# Patient Record
Sex: MALE | Race: WHITE | NOT HISPANIC OR LATINO | Employment: FULL TIME | URBAN - METROPOLITAN AREA
[De-identification: names, ages, dates, MRNs, and addresses within clinical notes are randomized per-mention and may not be internally consistent; named-entity substitution may affect disease eponyms.]

---

## 2017-01-19 ENCOUNTER — TELEPHONE (OUTPATIENT)
Dept: ORTHOPEDIC SURGERY | Facility: CLINIC | Age: 27
End: 2017-01-19

## 2017-01-19 DIAGNOSIS — Z98.890 S/P RIGHT KNEE ARTHROSCOPY: Primary | ICD-10-CM

## 2017-01-23 ENCOUNTER — HOSPITAL ENCOUNTER (OUTPATIENT)
Dept: PHYSICAL THERAPY | Facility: HOSPITAL | Age: 27
Setting detail: THERAPIES SERIES
Discharge: HOME | End: 2017-01-23

## 2017-01-23 DIAGNOSIS — S83.241A ACUTE MEDIAL MENISCAL TEAR, RIGHT, INITIAL ENCOUNTER: Primary | ICD-10-CM

## 2017-01-23 PROCEDURE — 97161 PT EVAL LOW COMPLEX 20 MIN: CPT
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
## 2017-01-25 ENCOUNTER — HOSPITAL ENCOUNTER (OUTPATIENT)
Dept: PHYSICAL THERAPY | Facility: HOSPITAL | Age: 27
Setting detail: THERAPIES SERIES
Discharge: HOME | End: 2017-01-25

## 2017-01-25 DIAGNOSIS — S83.241A ACUTE MEDIAL MENISCAL TEAR, RIGHT, INITIAL ENCOUNTER: Primary | ICD-10-CM

## 2017-01-25 PROCEDURE — 97110 THERAPEUTIC EXERCISES: CPT

## 2017-01-25 PROCEDURE — 97035 APP MDLTY 1+ULTRASOUND EA 15: CPT

## 2017-01-25 NOTE — PROGRESS NOTES
Outpatient Physical Therapy Ortho Treatment Note   Georgina Osman     Patient Name: Ciro Escoto  : 1990  MRN: [id number]  Today's Date: 2017      Visit Date: 2017    Visit Dx:    ICD-10-CM ICD-9-CM   1. Acute medial meniscal tear, right, initial encounter S83.241A 836.0       There is no problem list on file for this patient.       Past Medical History   Diagnosis Date   • Tear of meniscus of knee         Past Surgical History   Procedure Laterality Date   • Knee surgery     • Appendectomy     • Knee arthroscopy Right 2016     Procedure: KNEE ARTHROSCOPY;  Surgeon: Manjeet Cabral MD;  Location: [facility];  Service:              PT Ortho       17 0700    Posture/Observations    Posture- WNL Posture is WNL  -AS    Sensation    Sensation WNL? WNL  -AS    Knee Palpation    Patella Right:   Not Tender  -AS    Patella Tendon Right:;Tender  -AS    Medial Joint Line Right:;Tender  -AS    Lateral Joint Line Right:   Not Tender  -AS    Quads Right:   Not Tender  -AS    Patellar Accessory Motions    Superior glide Right:;WNL  -AS    Inferior glide Right:;WNL  -AS    Medial glide Right:;WNL  -AS    Lateral glide Right:;WNL  -AS    Right Knee    Extension/Flexion AROM Deficit 0-127 post stretch   0-118 pre stretch  -AS    Right Hip    Hip Flexion Gross Movement (5/5) normal  -AS    Hip Extension Gross Movement (4+/5) good plus  -AS    Hip ABduction Gross Movement (4+/5) good plus  -AS    Right Knee    Knee Extension Gross Movement (4-/5) good minus  -AS    Knee Flexion Gross Movement (4-/5) good minus  -AS    Lower Extremity Flexibility    Hamstrings Right:;Mildly limited  -AS      User Key  (r) = Recorded By, (t) = Taken By, (c) = Cosigned By    Initials Name Provider Type    AS Aanm Montero, PT Physical Therapist                            PT Assessment/Plan       17 0600 17 0700       PT Assessment    Functional Limitations  Limitation in home management;Limitations in  community activities;Performance in leisure activities;Performance in self-care ADL;Performance in sport activities;Performance in work activities  -AS     Impairments  Edema;Impaired flexibility;Joint mobility;Muscle strength;Pain;Range of motion  -AS     Assessment Comments Progressed patient with strengthening exercises today as well as introduced modalities today without complaints.  -AS Patient reports to outpatient PT s/p right medial meniscus repair. Patient has dcereased flexion ROM, decreased strength, and increased pain in right knee. Patient has decreased function and decreased ability to perform work related activities.  -AS     Please refer to paper survey for additional self-reported information  Yes  -AS     Rehab Potential  Good  -AS     Patient/caregiver participated in establishment of treatment plan and goals  Yes  -AS     Patient would benefit from skilled therapy intervention  Yes  -AS     PT Plan    PT Frequency  2x/week  -AS     Predicted Duration of Therapy Intervention (days/wks)  4-6 weeks  -AS     Planned CPT's?  PT RE-EVAL: 92463;PT THER PROC EA 15 MIN: 98270;PT THER ACT EA 15 MIN: 08129;PT MANUAL THERAPY EA 15 MIN: 37716;PT NEUROMUSC RE-EDUCATION EA 15 MIN: 56640;PT HOT OR COLD PACK TREAT MCARE;PT ELECTRICAL STIM UNATTEND: ;PT ULTRASOUND EA 15 MIN: 93787  -AS     PT Plan Comments Progress patient with strengthening, flexibility, and ROM exercises as tolerate dby the patient in order to retun him to his prior level of function.   -AS        User Key  (r) = Recorded By, (t) = Taken By, (c) = Cosigned By    Initials Name Provider Type    AS Anam Montero, PT Physical Therapist                Modalities       01/25/17 0600          Ice    Ice Applied Yes  -AS      Location Right knee  -AS      Rx Minutes 10 mins  -AS      Ice S/P Rx Yes  -AS      Ultrasound 93552    Location right medial/lateral meniscus  -AS      Rx Minutes 8 min  -AS      Duty Cycle 100  -AS      Frequency 1.0  MHz  -AS      Intensity - Wts/cm 1.2  -AS        User Key  (r) = Recorded By, (t) = Taken By, (c) = Cosigned By    Initials Name Provider Type    AS Anam Montero, PT Physical Therapist                Exercises       01/25/17 0600          Subjective Comments    Subjective Comments Patient states he was a little sore after his first visit but states this is improving.  -AS      Exercise 1    Exercise Name 1 Cycle   seat 1  -AS      Time (Minutes) 1 5 min  -AS      Exercise 2    Exercise Name 2 HS Stretch  -AS      Reps 2 10  -AS      Time (Seconds) 2 10 sec hold  -AS      Exercise 3    Exercise Name 3 4-Way Hip  -AS      Reps 3 30  -AS      Exercise 4    Exercise Name 4 LAQ  -AS      Reps 4 30  -AS      Exercise 5    Exercise Name 5 TKE  -AS      Equipment 5 Theraband  -AS      Resistance 5 Other (comment)   Gold  -AS      Reps 5 30  -AS      Exercise 6    Exercise Name 6 FWD Step Ups   6 inch step  -AS      Reps 6 15  -AS      Exercise 7    Exercise Name 7 Lateral Step Overs   6 inch step  -AS      Reps 7 15  -AS      Exercise 8    Exercise Name 8 Lateral Dips   2 inch step  -AS      Reps 8 15  -AS        User Key  (r) = Recorded By, (t) = Taken By, (c) = Cosigned By    Initials Name Provider Type    AS Anam Montero, PT Physical Therapist                               PT OP Goals       01/23/17 0700          PT Short Term Goals    STG Date to Achieve 02/13/17  -AS      STG 1 Patient to be compliant with his initial HEP for flexibility, ROM, and strength.  -AS      STG 2 Patient to improve his right knee flexion ROM to 120 degrees.  -AS      STG 3 Patient to improve his right knee flexion and extension MMT to 4/5.  -AS      STG 4 Patient to report pain of on VAS of 3-4/10 with ADL's and work related activities.  -AS      Long Term Goals    LTG Date to Achieve 03/06/17  -AS      LTG 1 Patient to be compliant with his advanced HEP for flexibility, ROM, and strength.  -AS      LTG 2 Patient to improve  his right knee flexion ROM to 125 degrees.  -AS      LTG 3 Patient to improve his right knee flexion and extension MMT to 5/5.  -AS      LTG 4 Patient to report pain of on VAS of 0-1/10 with ADL's and work related activities.  -AS      LTG 5 Patient to report improved function and decreased pain on LEFS by 15-20 points.  -AS      Time Calculation    PT Goal Re-Cert Due Date 02/20/17  -AS        User Key  (r) = Recorded By, (t) = Taken By, (c) = Cosigned By    Initials Name Provider Type    AS Anam Montero, JED Physical Therapist                Therapy Education       01/23/17 0730    Therapy Education    Given HEP  -AS    Program New  -AS    How Provided Verbal;Demonstration;Written  -AS    Provided to Patient  -AS    Level of Understanding Teach back education performed;Verbalized;Demonstrated  -AS      User Key  (r) = Recorded By, (t) = Taken By, (c) = Cosigned By    Initials Name Provider Type    AS Aanm Montero PT Physical Therapist                Outcome Measures       01/23/17 0700          Lower Extremity Functional Index    Any of your usual work, housework or school activities 3  -AS      Your usual hobbies, recreational or sporting activities 2  -AS      Getting into or out of the bath 3  -AS      Walking between rooms 3  -AS      Putting on your shoes or socks 3  -AS      Squatting 1  -AS      Lifting an object, like a bag of groceries from the floor 2  -AS      Performing light activities around your home 3  -AS      Performing heavy activities around your home 2  -AS      Getting into or out of a car 3  -AS      Walking 2 blocks 2  -AS      Walking a mile 1  -AS      Going up or down 10 stairs (about 1 flight of stairs) 1  -AS      Standing for 1 hour 1  -AS      Sitting for 1 hour 2  -AS      Running on even ground 2  -AS      Running on uneven ground 1  -AS      Making sharp turns while running fast 1  -AS      Hopping 1  -AS      Rolling over in bed 4  -AS      Total 41  -AS       Functional Assessment    Outcome Measure Options Lower Extremity Functional Scale (LEFS)  -AS        User Key  (r) = Recorded By, (t) = Taken By, (c) = Cosigned By    Initials Name Provider Type    AS Anam Montero, PT Physical Therapist            Time Calculation:   Start Time: 0615  Stop Time: 0707  Time Calculation (min): 52 min    Therapy Charges for Today     Code Description Service Date Service Provider Modifiers Qty    91586098085  PT THER PROC EA 15 MIN 1/25/2017 Anam Montero, PT GP 2    [id number]  PT ULTRASOUND EA 15 MIN 1/25/2017 Anam Montero, PT GP 1                    Anam Montero, PT  1/25/2017

## 2017-01-31 ENCOUNTER — TELEPHONE (OUTPATIENT)
Dept: ORTHOPEDIC SURGERY | Facility: CLINIC | Age: 27
End: 2017-01-31

## 2017-02-01 NOTE — TELEPHONE ENCOUNTER
Patient states he did not go for therapy today, pain was too great.  He will be scheduled to see Dr. Cabral at the next open appointment.

## 2017-02-02 ENCOUNTER — OFFICE VISIT (OUTPATIENT)
Dept: ORTHOPEDIC SURGERY | Facility: CLINIC | Age: 27
End: 2017-02-02

## 2017-02-02 VITALS — BODY MASS INDEX: 23.03 KG/M2 | WEIGHT: 170 LBS | HEIGHT: 72 IN

## 2017-02-02 DIAGNOSIS — Z98.890 S/P RIGHT KNEE ARTHROSCOPY: Primary | ICD-10-CM

## 2017-02-02 DIAGNOSIS — R52 PAIN: ICD-10-CM

## 2017-02-02 PROCEDURE — 99212 OFFICE O/P EST SF 10 MIN: CPT | Performed by: ORTHOPAEDIC SURGERY

## 2017-02-02 RX ORDER — MELOXICAM 15 MG/1
15 TABLET ORAL DAILY
Qty: 30 TABLET | Refills: 5 | Status: SHIPPED | OUTPATIENT
Start: 2017-02-02

## 2017-02-02 NOTE — PROGRESS NOTES
"Subjective: Right knee pain     Patient ID: Ciro Escoto is a 26 y.o. male.    Chief Complaint:    History of Present Illness patient returns stating his knee is still symptomatic.  After couple of therapy visits he states the pain was worse he stopped going.  States he also had a doing stretching and strengthening exercises in addition to the ultrasound.       Social History     Occupational History   • Not on file.     Social History Main Topics   • Smoking status: Never Smoker   • Smokeless tobacco: Never Used   • Alcohol use 6.0 oz/week     8 Cans of beer, 2 Shots of liquor per week      Comment: \"social\"   • Drug use: No   • Sexual activity: Defer      Review of Systems   Constitutional: Negative for chills, diaphoresis, fever and unexpected weight change.   HENT: Negative for hearing loss, nosebleeds, sore throat and tinnitus.    Eyes: Negative for pain and visual disturbance.   Respiratory: Negative for cough, shortness of breath and wheezing.    Cardiovascular: Negative for chest pain and palpitations.   Gastrointestinal: Negative for abdominal pain, diarrhea, nausea and vomiting.   Endocrine: Negative for cold intolerance, heat intolerance and polydipsia.   Genitourinary: Negative for difficulty urinating, dysuria and hematuria.   Musculoskeletal: Positive for myalgias. Negative for arthralgias and joint swelling.   Skin: Negative for rash and wound.   Allergic/Immunologic: Negative for environmental allergies.   Neurological: Negative for dizziness, syncope and numbness.   Hematological: Does not bruise/bleed easily.   Psychiatric/Behavioral: Negative for dysphoric mood and sleep disturbance. The patient is not nervous/anxious.          Past Medical History   Diagnosis Date   • Tear of meniscus of knee      Past Surgical History   Procedure Laterality Date   • Knee surgery     • Appendectomy     • Knee arthroscopy Right 9/30/2016     Procedure: KNEE ARTHROSCOPY;  Surgeon: Manjeet Cabral MD;  [facility]: [facility];  Service:      History reviewed. No pertinent family history.      Objective:  There were no vitals filed for this visit.  Last 3 weights    02/02/17  1059   Weight: 170 lb (77.1 kg)     Body mass index is 23.06 kg/(m^2).       Ortho Exam  is no effusion or swelling erythema to the right knee.  He has full range of motion with no crepitus or instability.  No motor deficit.    Assessment:     No diagnosis found.      Plan:        We'll increase the Mobic to 15 mg daily will restart therapy consisting of ultrasound and e-stim only.  Return in 4 weeks  Work Status:    CJ query complete.    Orders:  No orders of the defined types were placed in this encounter.      Medications:  No orders of the defined types were placed in this encounter.      Followup:  No Follow-up on file.          Dragon transcription disclaimer     Much of this encounter note is an electronic transcription/translation of spoken language to printed text. The electronic translation of spoken language may permit erroneous, or at times, nonsensical words or phrases to be inadvertently transcribed. Although I have reviewed the note for such errors, some may still exist.

## 2017-02-08 ENCOUNTER — DOCUMENTATION (OUTPATIENT)
Dept: PHYSICAL THERAPY | Facility: HOSPITAL | Age: 27
End: 2017-02-08

## 2017-02-08 DIAGNOSIS — S83.241A ACUTE MEDIAL MENISCAL TEAR, RIGHT, INITIAL ENCOUNTER: Primary | ICD-10-CM

## 2017-02-08 NOTE — PROGRESS NOTES
**Missed Visit**      Patient's mom called to schedule today's appointment at 7:00am. This appointment time/date was confirmed with patient. Patient failed to show for his appointment this morning. He has now failed to show for his last 3 physical therapy appointments.

## 2017-02-10 ENCOUNTER — HOSPITAL ENCOUNTER (OUTPATIENT)
Dept: PHYSICAL THERAPY | Facility: HOSPITAL | Age: 27
Setting detail: THERAPIES SERIES
Discharge: HOME | End: 2017-02-10

## 2017-02-10 DIAGNOSIS — S83.241A ACUTE MEDIAL MENISCAL TEAR, RIGHT, INITIAL ENCOUNTER: Primary | ICD-10-CM

## 2017-02-10 PROCEDURE — 97035 APP MDLTY 1+ULTRASOUND EA 15: CPT

## 2017-02-10 NOTE — PROGRESS NOTES
Outpatient Physical Therapy Ortho Treatment Note   Georgina Osman     Patient Name: Ciro Escoto  : 1990  MRN: [id number]  Today's Date: 2/10/2017      Visit Date: 02/10/2017    Visit Dx:    ICD-10-CM ICD-9-CM   1. Acute medial meniscal tear, right, initial encounter S83.241A 836.0       There is no problem list on file for this patient.       Past Medical History   Diagnosis Date   • Tear of meniscus of knee         Past Surgical History   Procedure Laterality Date   • Knee surgery     • Appendectomy     • Knee arthroscopy Right 2016     Procedure: KNEE ARTHROSCOPY;  Surgeon: Manjeet Cabral MD;  Location: [facility];  Service:                              PT Assessment/Plan       02/10/17 1200          PT Assessment    Assessment Comments Performed ultrasound only today to patient's right patellar tendon. he tolerated this well today without complaints of increased pain or discomfort.  -AS      PT Plan    PT Plan Comments Continue with plan of care per physician.  -AS        User Key  (r) = Recorded By, (t) = Taken By, (c) = Cosigned By    Initials Name Provider Type    AS Anam Montero PT Physical Therapist                Modalities       02/10/17 1200          Ultrasound 67733    Location Right patellar tendon  -AS      Rx Minutes 8 min  -AS      Duty Cycle 100  -AS      Frequency 1.0 MHz  -AS      Intensity - Wts/cm 1.5  -AS        User Key  (r) = Recorded By, (t) = Taken By, (c) = Cosigned By    Initials Name Provider Type    AS Anam Montero, PT Physical Therapist                Exercises       02/10/17 1200          Subjective Comments    Subjective Comments Patient states he was in a lot of pain a few days ago but states his pain has improved. He states he is only wanting Ultrasound for treatment.  -AS        User Key  (r) = Recorded By, (t) = Taken By, (c) = Cosigned By    Initials Name Provider Type    AS Anam Montero PT Physical Therapist                                        Time Calculation:   Start Time: 0620  Stop Time: 0636  Time Calculation (min): 16 min    Therapy Charges for Today     Code Description Service Date Service Provider Modifiers Qty    11088127071 HC PT ULTRASOUND EA 15 MIN 2/10/2017 Anam Montero, PT GP 1                    Anam Montero, PT  2/10/2017

## 2017-02-14 ENCOUNTER — DOCUMENTATION (OUTPATIENT)
Dept: PHYSICAL THERAPY | Facility: HOSPITAL | Age: 27
End: 2017-02-14

## 2017-02-14 ENCOUNTER — APPOINTMENT (OUTPATIENT)
Dept: PHYSICAL THERAPY | Facility: HOSPITAL | Age: 27
End: 2017-02-14

## 2017-02-14 DIAGNOSIS — S83.241A ACUTE MEDIAL MENISCAL TEAR, RIGHT, INITIAL ENCOUNTER: Primary | ICD-10-CM

## 2017-02-14 NOTE — PROGRESS NOTES
**MISSED VISIT NOTE**      Patient failed to show for his outpatient PT appointment again this morning. He has been documented as a no call/no show.

## 2017-02-16 ENCOUNTER — HOSPITAL ENCOUNTER (OUTPATIENT)
Dept: PHYSICAL THERAPY | Facility: HOSPITAL | Age: 27
Setting detail: THERAPIES SERIES
Discharge: HOME | End: 2017-02-16

## 2017-02-16 DIAGNOSIS — S83.241A ACUTE MEDIAL MENISCAL TEAR, RIGHT, INITIAL ENCOUNTER: Primary | ICD-10-CM

## 2017-02-16 PROCEDURE — 97035 APP MDLTY 1+ULTRASOUND EA 15: CPT

## 2017-02-16 NOTE — PROGRESS NOTES
Outpatient Physical Therapy Ortho Treatment Note   Georgina Osman     Patient Name: Ciro Escoto  : 1990  MRN: [id number]  Today's Date: 2017      Visit Date: 2017    Visit Dx:    ICD-10-CM ICD-9-CM   1. Acute medial meniscal tear, right, initial encounter S83.241A 836.0       There is no problem list on file for this patient.       Past Medical History   Diagnosis Date   • Tear of meniscus of knee         Past Surgical History   Procedure Laterality Date   • Knee surgery     • Appendectomy     • Knee arthroscopy Right 2016     Procedure: KNEE ARTHROSCOPY;  Surgeon: Manjeet Cabral MD;  Location: [facility];  Service:                              PT Assessment/Plan       17 0700 02/10/17 1200       PT Assessment    Assessment Comments Performed ultrasound only today to patient's right patellar tendon. he tolerated this well today without complaints of increased pain or discomfort.  -AS Performed ultrasound only today to patient's right patellar tendon. he tolerated this well today without complaints of increased pain or discomfort.  -AS     PT Plan    PT Plan Comments Continue with plan of care per physician.  -AS Continue with plan of care per physician.  -AS       User Key  (r) = Recorded By, (t) = Taken By, (c) = Cosigned By    Initials Name Provider Type    AS Anam Montero, PT Physical Therapist                Modalities       17 07          Ultrasound 39826    Location Right patellar tendon  -AS      Rx Minutes 8 min  -AS      Duty Cycle 100  -AS      Frequency 1.0 MHz  -AS      Intensity - Wts/cm 1.5  -AS        User Key  (r) = Recorded By, (t) = Taken By, (c) = Cosigned By    Initials Name Provider Type    AS Anam Montero, PT Physical Therapist                Exercises       17 07          Subjective Comments    Subjective Comments Patient states his knee is feeling about the same.  -AS        User Key  (r) = Recorded By, (t) = Taken By, (c) =  Cosigned By    Initials Name Provider Type    AS Anam Montero, PT Physical Therapist                                       Time Calculation:   Start Time: 0702  Stop Time: 0713  Time Calculation (min): 11 min    Therapy Charges for Today     Code Description Service Date Service Provider Modifiers Qty    63770459647 HC PT ULTRASOUND EA 15 MIN 2/16/2017 Anam Montero, PT GP 1                    Anam Montero, PT  2/16/2017

## 2017-02-20 ENCOUNTER — HOSPITAL ENCOUNTER (OUTPATIENT)
Dept: PHYSICAL THERAPY | Facility: HOSPITAL | Age: 27
Setting detail: THERAPIES SERIES
Discharge: HOME | End: 2017-02-20

## 2017-02-20 DIAGNOSIS — S83.241A ACUTE MEDIAL MENISCAL TEAR, RIGHT, INITIAL ENCOUNTER: Primary | ICD-10-CM

## 2017-02-20 PROCEDURE — 97035 APP MDLTY 1+ULTRASOUND EA 15: CPT

## 2017-02-20 NOTE — PROGRESS NOTES
Outpatient Physical Therapy Ortho Treatment Note   Georgina Osman     Patient Name: Ciro Escoto  : 1990  MRN: [id number]  Today's Date: 2017      Visit Date: 2017    Visit Dx:    ICD-10-CM ICD-9-CM   1. Acute medial meniscal tear, right, initial encounter S83.241A 836.0       There is no problem list on file for this patient.       Past Medical History   Diagnosis Date   • Tear of meniscus of knee         Past Surgical History   Procedure Laterality Date   • Knee surgery     • Appendectomy     • Knee arthroscopy Right 2016     Procedure: KNEE ARTHROSCOPY;  Surgeon: Manjeet Cabral MD;  Location: [facility];  Service:                              PT Assessment/Plan       17       PT Assessment    Assessment Comments Continue with plan of care per physician.  -AS Performed ultrasound only today to patient's right patellar tendon. he tolerated this well today without complaints of increased pain or discomfort.  -AS     PT Plan    PT Plan Comments Continue with plan of care per physician.  -AS Continue with plan of care per physician.  -AS       User Key  (r) = Recorded By, (t) = Taken By, (c) = Cosigned By    Initials Name Provider Type    AS Anam Montero, PT Physical Therapist                Modalities       17          Subjective Comments    Subjective Comments Patient reports improved knee pain.  -AS      Ultrasound 66841    Location Right patellar tendon  -AS      Rx Minutes 8 min  -AS      Duty Cycle 100  -AS      Frequency 1.0 MHz  -AS      Intensity - Wts/cm 1.5  -AS        User Key  (r) = Recorded By, (t) = Taken By, (c) = Cosigned By    Initials Name Provider Type    AS Anam Montero, PT Physical Therapist                Exercises       17          Subjective Comments    Subjective Comments Patient reports improved knee pain.  -AS        User Key  (r) = Recorded By, (t) = Taken By, (c) = Cosigned By    Initials Name Provider  Type    AS Anam Montero, PT Physical Therapist                                       Time Calculation:   Start Time: 0716  Stop Time: 0735  Time Calculation (min): 19 min    Therapy Charges for Today     Code Description Service Date Service Provider Modifiers Qty    09563079275 HC PT ULTRASOUND EA 15 MIN 2/20/2017 Anam Montero, PT GP 1                    Anam Montero, PT  2/20/2017

## 2017-02-23 ENCOUNTER — APPOINTMENT (OUTPATIENT)
Dept: PHYSICAL THERAPY | Facility: HOSPITAL | Age: 27
End: 2017-02-23

## 2017-03-23 ENCOUNTER — OFFICE VISIT (OUTPATIENT)
Dept: ORTHOPEDIC SURGERY | Facility: CLINIC | Age: 27
End: 2017-03-23

## 2017-03-23 DIAGNOSIS — R52 PAIN: ICD-10-CM

## 2017-03-23 DIAGNOSIS — Z98.890 S/P RIGHT KNEE ARTHROSCOPY: Primary | ICD-10-CM

## 2017-03-23 PROCEDURE — 99212 OFFICE O/P EST SF 10 MIN: CPT | Performed by: ORTHOPAEDIC SURGERY

## 2017-03-23 NOTE — PROGRESS NOTES
"Subjective: Right knee pain     Patient ID: Ciro Escoto is a 26 y.o. male.    Chief Complaint:    History of Present Illness 26-year-old male returns still having moderate to severe pain and discomfort in his right knee despite anti-inflammatories cortisone injection and physical therapy.  His been 6 months since his surgery for the meniscal tear and he still any symptoms in the knee.       Social History     Occupational History   • Not on file.     Social History Main Topics   • Smoking status: Never Smoker   • Smokeless tobacco: Never Used   • Alcohol use 6.0 oz/week     8 Cans of beer, 2 Shots of liquor per week      Comment: \"social\"   • Drug use: No   • Sexual activity: Defer      Review of Systems   Constitutional: Negative for chills, diaphoresis, fever and unexpected weight change.   HENT: Negative for hearing loss, nosebleeds, sore throat and tinnitus.    Eyes: Negative for pain and visual disturbance.   Respiratory: Negative for cough, shortness of breath and wheezing.    Cardiovascular: Negative for chest pain and palpitations.   Gastrointestinal: Negative for abdominal pain, diarrhea, nausea and vomiting.   Endocrine: Negative for cold intolerance, heat intolerance and polydipsia.   Genitourinary: Negative for difficulty urinating, dysuria and hematuria.   Musculoskeletal: Negative for arthralgias, joint swelling and myalgias.   Skin: Negative for rash and wound.   Allergic/Immunologic: Negative for environmental allergies.   Neurological: Negative for dizziness, syncope and numbness.   Hematological: Does not bruise/bleed easily.   Psychiatric/Behavioral: Negative for dysphoric mood and sleep disturbance. The patient is not nervous/anxious.    All other systems reviewed and are negative.        Past Medical History:   Diagnosis Date   • Tear of meniscus of knee      Past Surgical History:   Procedure Laterality Date   • APPENDECTOMY     • KNEE ARTHROSCOPY Right 9/30/2016    Procedure: KNEE " ARTHROSCOPY;  Surgeon: Manjeet Cabral MD;  Location: [facility];  Service:    • KNEE SURGERY       No family history on file.      Objective:  There were no vitals filed for this visit.  There were no vitals filed for this visit.  There is no height or weight on file to calculate BMI.       Ortho Exam  is no motor deficit good distal pulses.  His no effusion swelling erythema.  The skin is cool to touch.  He has some quad tendinitis but also significant medial joint line tenderness and anterior knee pain.  There is no instability.    Assessment:       1. S/P right knee arthroscopy    2. Pain          Plan:      I want to repeat the MRI to fully evaluate the knee return after completion.      Work Status:    [employer] query complete.    Orders:  Orders Placed This Encounter   Procedures   • MRI Knee Right Without Contrast       Medications:  No orders of the defined types were placed in this encounter.      Followup:  Return in about 1 week (around 3/30/2017).          Dragon transcription disclaimer     Much of this encounter note is an electronic transcription/translation of spoken language to printed text. The electronic translation of spoken language may permit erroneous, or at times, nonsensical words or phrases to be inadvertently transcribed. Although I have reviewed the note for such errors, some may still exist.

## 2017-03-29 ENCOUNTER — HOSPITAL ENCOUNTER (OUTPATIENT)
Dept: MRI IMAGING | Facility: HOSPITAL | Age: 27
Discharge: HOME | End: 2017-03-29
Attending: ORTHOPAEDIC SURGERY | Admitting: ORTHOPAEDIC SURGERY

## 2017-03-29 DIAGNOSIS — R52 PAIN: ICD-10-CM

## 2017-03-29 DIAGNOSIS — Z98.890 S/P RIGHT KNEE ARTHROSCOPY: ICD-10-CM

## 2017-03-29 PROCEDURE — 73721 MRI JNT OF LWR EXTRE W/O DYE: CPT

## 2017-04-10 ENCOUNTER — OFFICE VISIT (OUTPATIENT)
Dept: ORTHOPEDIC SURGERY | Facility: CLINIC | Age: 27
End: 2017-04-10

## 2017-04-10 DIAGNOSIS — Z98.890 S/P RIGHT KNEE ARTHROSCOPY: Primary | ICD-10-CM

## 2017-04-10 DIAGNOSIS — S83.241A ACUTE MEDIAL MENISCAL TEAR, RIGHT, INITIAL ENCOUNTER: ICD-10-CM

## 2017-04-10 PROCEDURE — 99213 OFFICE O/P EST LOW 20 MIN: CPT | Performed by: ORTHOPAEDIC SURGERY

## 2017-04-10 NOTE — PROGRESS NOTES
"Subjective: Right knee pain     Patient ID: Ciro Escoto is a 26 y.o. male.    Chief Complaint:    History of Present Illness 26-year-old male returns after having undergone the MRI of his right knee.    Reviewed the MRI personally and the report shows an apparent recurrent tear of the body posterior horn of the medial meniscus tear involving longitudinal oblique tear of the meniscus.  There is no loose body or other pathology noted.  He still having significant pain which she states interfering with activities of daily living.  Having pain with work and off work activities.  It is not resolved with treatment rendered to date.  Again he underwent surgery in September for a tear and has a new tear apparently per MRI.       Social History     Occupational History   • Not on file.     Social History Main Topics   • Smoking status: Never Smoker   • Smokeless tobacco: Never Used   • Alcohol use 6.0 oz/week     8 Cans of beer, 2 Shots of liquor per week      Comment: \"social\"   • Drug use: No   • Sexual activity: Defer      Review of Systems   Constitutional: Negative for chills, diaphoresis, fever and unexpected weight change.   HENT: Negative for hearing loss, nosebleeds, sore throat and tinnitus.    Eyes: Negative for pain and visual disturbance.   Respiratory: Negative for cough, shortness of breath and wheezing.    Cardiovascular: Negative for chest pain and palpitations.   Gastrointestinal: Negative for abdominal pain, diarrhea, nausea and vomiting.   Endocrine: Negative for cold intolerance, heat intolerance and polydipsia.   Genitourinary: Negative for difficulty urinating, dysuria and hematuria.   Musculoskeletal: Negative for arthralgias, joint swelling and myalgias.   Skin: Negative for rash and wound.   Allergic/Immunologic: Negative for environmental allergies.   Neurological: Negative for dizziness, syncope and numbness.   Hematological: Does not bruise/bleed easily.   Psychiatric/Behavioral: Negative for " dysphoric mood and sleep disturbance. The patient is not nervous/anxious.    All other systems reviewed and are negative.        Past Medical History:   Diagnosis Date   • Tear of meniscus of knee      Past Surgical History:   Procedure Laterality Date   • APPENDECTOMY     • KNEE ARTHROSCOPY Right 9/30/2016    Procedure: KNEE ARTHROSCOPY;  Surgeon: Manjeet Cabral MD;  Location: [facility];  Service:    • KNEE SURGERY       No family history on file.      Objective:  There were no vitals filed for this visit.  There were no vitals filed for this visit.  There is no height or weight on file to calculate BMI.       Ortho Exam  again I reviewed the MRI results with the patient.  He is alert and oriented.  The knee shows no swelling effusion erythema.  He has 0-125° of motion.  The skin is cool to touch.  There is no motor loss good distal pulses.  Calf is nontender to palpation with no swelling.  There is medial joint line tenderness with an equivocal medial Tico's.  No patella subluxation.  No crepitus noted.  No instability 0 90°.  His quads and hamstrings are 5 over 5.    Assessment:       1. S/P right knee arthroscopy    2. Acute medial meniscal tear, right, initial encounter          Plan:      reviewed the findings of the MRI with the patient and answered all questions regarding the findings and his physical exam.  At this time I recommend that if he is having more pain than is well-known the live with repeat arthroscopic debridement.  Again reviewed the length of rehabilitation rate which is plus or -4 week following surgery before returning to work.  He understands and wants to proceed with surgery or he needs to talk with work before scheduling but is looking at possibly April 21.      Work Status:    [employer] query complete.    Orders:  No orders of the defined types were placed in this encounter.      Medications:  No orders of the defined types were placed in this encounter.      Followup:  Return in about  10 days (around 4/20/2017).          Dragon transcription disclaimer     Much of this encounter note is an electronic transcription/translation of spoken language to printed text. The electronic translation of spoken language may permit erroneous, or at times, nonsensical words or phrases to be inadvertently transcribed. Although I have reviewed the note for such errors, some may still exist.

## 2017-04-12 ENCOUNTER — PREP FOR SURGERY (OUTPATIENT)
Dept: ORTHOPEDIC SURGERY | Facility: CLINIC | Age: 27
End: 2017-04-12

## 2017-04-18 ENCOUNTER — PREP FOR SURGERY (OUTPATIENT)
Dept: ORTHOPEDIC SURGERY | Facility: CLINIC | Age: 27
End: 2017-04-18

## 2017-04-18 DIAGNOSIS — S83.206A ACUTE MENISCAL TEAR OF KNEE, RIGHT, INITIAL ENCOUNTER: Primary | ICD-10-CM

## 2017-04-19 ENCOUNTER — PREP FOR SURGERY (OUTPATIENT)
Dept: ORTHOPEDIC SURGERY | Facility: CLINIC | Age: 27
End: 2017-04-19

## 2017-04-19 ENCOUNTER — LAB (OUTPATIENT)
Dept: LAB | Facility: HOSPITAL | Age: 27
End: 2017-04-19

## 2017-04-19 DIAGNOSIS — S83.206A ACUTE MENISCAL TEAR OF KNEE, RIGHT, INITIAL ENCOUNTER: Primary | ICD-10-CM

## 2017-04-19 DIAGNOSIS — S83.206A ACUTE MENISCAL TEAR OF KNEE, RIGHT, INITIAL ENCOUNTER: ICD-10-CM

## 2017-04-19 LAB
ANION GAP SERPL CALCULATED.3IONS-SCNC: 11.5 MMOL/L
BASOPHILS # BLD AUTO: 0.07 10*3/MM3 (ref 0–0.2)
BASOPHILS NFR BLD AUTO: 0.9 % (ref 0–2)
BUN BLD-MCNC: 13 MG/DL (ref 6–20)
BUN/CREAT SERPL: 13.1 (ref 7–25)
CALCIUM SPEC-SCNC: 9.5 MG/DL (ref 8.6–10.5)
CHLORIDE SERPL-SCNC: 99 MMOL/L (ref 98–107)
CO2 SERPL-SCNC: 26.5 MMOL/L (ref 22–29)
CREAT BLD-MCNC: 0.99 MG/DL (ref 0.76–1.27)
DEPRECATED RDW RBC AUTO: 39.1 FL (ref 37–54)
EOSINOPHIL # BLD AUTO: 0.26 10*3/MM3 (ref 0.1–0.3)
EOSINOPHIL NFR BLD AUTO: 3.4 % (ref 0–4)
ERYTHROCYTE [DISTWIDTH] IN BLOOD BY AUTOMATED COUNT: 12.3 % (ref 11.5–14.5)
GFR SERPL CREATININE-BSD FRML MDRD: 92 ML/MIN/1.73
GLUCOSE BLD-MCNC: 95 MG/DL (ref 65–99)
HCT VFR BLD AUTO: 43.4 % (ref 42–52)
HGB BLD-MCNC: 14.9 G/DL (ref 14–18)
IMM GRANULOCYTES # BLD: 0.02 10*3/MM3 (ref 0–0.03)
IMM GRANULOCYTES NFR BLD: 0.3 % (ref 0–0.5)
LYMPHOCYTES # BLD AUTO: 2.94 10*3/MM3 (ref 0.6–4.8)
LYMPHOCYTES NFR BLD AUTO: 38 % (ref 20–45)
MCH RBC QN AUTO: 29.9 PG (ref 27–31)
MCHC RBC AUTO-ENTMCNC: 34.3 G/DL (ref 31–37)
MCV RBC AUTO: 87 FL (ref 80–94)
MONOCYTES # BLD AUTO: 0.72 10*3/MM3 (ref 0–1)
MONOCYTES NFR BLD AUTO: 9.3 % (ref 3–8)
NEUTROPHILS # BLD AUTO: 3.72 10*3/MM3 (ref 1.5–8.3)
NEUTROPHILS NFR BLD AUTO: 48.1 % (ref 45–70)
NRBC BLD MANUAL-RTO: 0 /100 WBC (ref 0–0)
PLATELET # BLD AUTO: 240 10*3/MM3 (ref 140–500)
PMV BLD AUTO: 10.1 FL (ref 7.4–10.4)
POTASSIUM BLD-SCNC: 4.3 MMOL/L (ref 3.5–5.2)
RBC # BLD AUTO: 4.99 10*6/MM3 (ref 4.7–6.1)
SODIUM BLD-SCNC: 137 MMOL/L (ref 136–145)
WBC NRBC COR # BLD: 7.73 10*3/MM3 (ref 4.8–10.8)

## 2017-04-19 PROCEDURE — 36415 COLL VENOUS BLD VENIPUNCTURE: CPT

## 2017-04-19 PROCEDURE — 80048 BASIC METABOLIC PNL TOTAL CA: CPT

## 2017-04-19 PROCEDURE — 85025 COMPLETE CBC W/AUTO DIFF WBC: CPT

## 2017-04-20 ENCOUNTER — ANESTHESIA EVENT (OUTPATIENT)
Dept: PERIOP | Facility: HOSPITAL | Age: 27
End: 2017-04-20

## 2017-04-20 PROCEDURE — S0260 H&P FOR SURGERY: HCPCS | Performed by: ORTHOPAEDIC SURGERY

## 2017-04-20 NOTE — H&P
"Orthopedic Surgery    Patient Care Team:  Grover Larios MD as PCP - General (Family Medicine)    CHIEF COMPLAINT: Right knee pain    HISTORY OF PRESENT ILLNESS: 26-year-old male is being admitted this time to undergo arthroscopic surgery for recurrent right medial meniscal tear.  Had surgery in September 2016 for meniscal tear and did well initially after surgery went back to work and started having increasing pain discomfort once again in the knee.  He had persistent pain despite nonoperative management and a repeat MRI was ordered showed a recurrent tear of the medial meniscus is admitted this time to go repeat arthroscopic debridement          Past Medical History:   Diagnosis Date   • Tear of meniscus of knee      Past Surgical History:   Procedure Laterality Date   • APPENDECTOMY     • KNEE ARTHROSCOPY Right 9/30/2016    Procedure: KNEE ARTHROSCOPY;  Surgeon: Manjeet Cabral MD;  Location: [facility];  Service:    • KNEE SURGERY Right      History reviewed. No pertinent family history.  Social History   Substance Use Topics   • Smoking status: Never Smoker   • Smokeless tobacco: Never Used   • Alcohol use 6.0 oz/week     8 Cans of beer, 2 Shots of liquor per week      Comment: \"social\"     No prescriptions prior to admission.     Allergies:  Review of patient's allergies indicates no known allergies.    REVIEW OF SYSTEMS:  Please see the above history of present illness for pertinent positives and negatives.  The remainder of the patient's systems have been reviewed and are negative.    Vital Signs       Flowsheet Rows         First Filed Value    Admission Height  74\" (188 cm) Documented at 04/20/2017 1140    Admission Weight  160 lb (72.6 kg) Documented at 04/20/2017 1140           Physical Exam:  Physical Exam   Constitutional: Patient appears well-developed and well-nourished and in no acute distress   HEENT:   Head: Normocephalic and atraumatic.   Eyes:  Pupils are equal, round, and reactive to " light.  Mouth and Throat: Patient has moist mucous membranes. Oropharynx is clear of any erythema or exudate.     Neck: Neck supple. No JVD present. No thyromegaly present. No lymphadenopathy present.  Cardiovascular: Regular rate, regular rhythm.  Pulmonary/Chest: Lungs are clear to auscultation bilaterally.  Abdominal:benign,soft with bowel sounds  Musculoskeletal: Normal posture.  Extremities: Right leg is no motor deficit good distal pulses.  No sensory loss.  His calf is nontender and not swollen.  The knee shows no effusion but he has recurrent medial joint line tenderness.  No instability.  No crepitus noted.  No muscle atrophy as far as quad hamstring or calf muscles.    Neurological: Patient is alert and oriented.  Psychological:   Mood and behavior appropriate.  Skin: Skin is warm and dry.     Results Review:    I reviewed the patient's new clinical results.  Lab Results (most recent)     None          Imaging Results (most recent)     None          ECG/EMG Results (most recent)     None            Assessment/Plan MRI shows recurrent medial meniscal tear of the right knee.        I discussed the patients findings and my recommendations with patient and plan to proceed arthroscopic debridement of the right medial meniscus.  Discussed the surgery with the patient and he understands and agrees.  Reviewed risk and benefits of the surgery with the patient.    Manjeet Cabral MD  04/20/17  12:34 PM

## 2017-04-21 ENCOUNTER — ANESTHESIA (OUTPATIENT)
Dept: PERIOP | Facility: HOSPITAL | Age: 27
End: 2017-04-21

## 2017-04-21 ENCOUNTER — HOSPITAL ENCOUNTER (OUTPATIENT)
Facility: HOSPITAL | Age: 27
Setting detail: HOSPITAL OUTPATIENT SURGERY
Discharge: HOME | End: 2017-04-21
Attending: ORTHOPAEDIC SURGERY | Admitting: ORTHOPAEDIC SURGERY

## 2017-04-21 VITALS
RESPIRATION RATE: 15 BRPM | HEIGHT: 74 IN | WEIGHT: 156 LBS | SYSTOLIC BLOOD PRESSURE: 115 MMHG | TEMPERATURE: 98.2 F | OXYGEN SATURATION: 98 % | DIASTOLIC BLOOD PRESSURE: 83 MMHG | HEART RATE: 70 BPM | BODY MASS INDEX: 20.02 KG/M2

## 2017-04-21 DIAGNOSIS — S83.206A ACUTE MENISCAL TEAR OF KNEE, RIGHT, INITIAL ENCOUNTER: ICD-10-CM

## 2017-04-21 PROCEDURE — 25010000002 MIDAZOLAM PER 1 MG: Performed by: NURSE ANESTHETIST, CERTIFIED REGISTERED

## 2017-04-21 PROCEDURE — 25010000002 FENTANYL CITRATE (PF) 100 MCG/2ML SOLUTION: Performed by: NURSE ANESTHETIST, CERTIFIED REGISTERED

## 2017-04-21 PROCEDURE — 25010000002 MORPHINE SULFATE (PF) 2 MG/ML SOLUTION 1 ML SYRINGE: Performed by: ORTHOPAEDIC SURGERY

## 2017-04-21 PROCEDURE — 29881 ARTHRS KNE SRG MNISECTMY M/L: CPT | Performed by: ORTHOPAEDIC SURGERY

## 2017-04-21 PROCEDURE — 25010000002 PROPOFOL 10 MG/ML EMULSION: Performed by: NURSE ANESTHETIST, CERTIFIED REGISTERED

## 2017-04-21 PROCEDURE — 25010000002 ONDANSETRON PER 1 MG: Performed by: NURSE ANESTHETIST, CERTIFIED REGISTERED

## 2017-04-21 PROCEDURE — 25010000002 DEXAMETHASONE PER 1 MG: Performed by: NURSE ANESTHETIST, CERTIFIED REGISTERED

## 2017-04-21 PROCEDURE — 25010000002 KETOROLAC TROMETHAMINE PER 15 MG: Performed by: NURSE ANESTHETIST, CERTIFIED REGISTERED

## 2017-04-21 RX ORDER — ONDANSETRON 2 MG/ML
4 INJECTION INTRAMUSCULAR; INTRAVENOUS ONCE AS NEEDED
Status: COMPLETED | OUTPATIENT
Start: 2017-04-21 | End: 2017-04-21

## 2017-04-21 RX ORDER — KETOROLAC TROMETHAMINE 30 MG/ML
INJECTION, SOLUTION INTRAMUSCULAR; INTRAVENOUS AS NEEDED
Status: DISCONTINUED | OUTPATIENT
Start: 2017-04-21 | End: 2017-04-21 | Stop reason: SURG

## 2017-04-21 RX ORDER — BACITRACIN ZINC 500 [USP'U]/G
OINTMENT TOPICAL AS NEEDED
Status: DISCONTINUED | OUTPATIENT
Start: 2017-04-21 | End: 2017-04-21 | Stop reason: HOSPADM

## 2017-04-21 RX ORDER — FAMOTIDINE 10 MG/ML
20 INJECTION, SOLUTION INTRAVENOUS
Status: DISCONTINUED | OUTPATIENT
Start: 2017-04-21 | End: 2017-04-21 | Stop reason: HOSPADM

## 2017-04-21 RX ORDER — DEXAMETHASONE SODIUM PHOSPHATE 4 MG/ML
4 INJECTION, SOLUTION INTRA-ARTICULAR; INTRALESIONAL; INTRAMUSCULAR; INTRAVENOUS; SOFT TISSUE ONCE
Status: COMPLETED | OUTPATIENT
Start: 2017-04-21 | End: 2017-04-21

## 2017-04-21 RX ORDER — MIDAZOLAM HYDROCHLORIDE 1 MG/ML
1 INJECTION INTRAMUSCULAR; INTRAVENOUS
Status: DISCONTINUED | OUTPATIENT
Start: 2017-04-21 | End: 2017-04-21 | Stop reason: HOSPADM

## 2017-04-21 RX ORDER — LIDOCAINE HYDROCHLORIDE 20 MG/ML
INJECTION, SOLUTION INFILTRATION; PERINEURAL AS NEEDED
Status: DISCONTINUED | OUTPATIENT
Start: 2017-04-21 | End: 2017-04-21 | Stop reason: SURG

## 2017-04-21 RX ORDER — LIDOCAINE HYDROCHLORIDE 10 MG/ML
0.3 INJECTION, SOLUTION EPIDURAL; INFILTRATION; INTRACAUDAL; PERINEURAL ONCE
Status: COMPLETED | OUTPATIENT
Start: 2017-04-21 | End: 2017-04-21

## 2017-04-21 RX ORDER — PROPOFOL 10 MG/ML
VIAL (ML) INTRAVENOUS AS NEEDED
Status: DISCONTINUED | OUTPATIENT
Start: 2017-04-21 | End: 2017-04-21 | Stop reason: SURG

## 2017-04-21 RX ORDER — HYDROCODONE BITARTRATE AND ACETAMINOPHEN 5; 325 MG/1; MG/1
1 TABLET ORAL EVERY 4 HOURS PRN
Qty: 30 TABLET | Refills: 0 | Status: SHIPPED | OUTPATIENT
Start: 2017-04-21 | End: 2017-04-27 | Stop reason: SDUPTHER

## 2017-04-21 RX ORDER — MAGNESIUM HYDROXIDE 1200 MG/15ML
LIQUID ORAL AS NEEDED
Status: DISCONTINUED | OUTPATIENT
Start: 2017-04-21 | End: 2017-04-21 | Stop reason: HOSPADM

## 2017-04-21 RX ORDER — MORPHINE SULFATE 1 MG/ML
INJECTION, SOLUTION EPIDURAL; INTRATHECAL; INTRAVENOUS
Status: DISCONTINUED | OUTPATIENT
Start: 1840-12-31 | End: 2017-04-21 | Stop reason: HOSPADM

## 2017-04-21 RX ORDER — HYDROCODONE BITARTRATE AND ACETAMINOPHEN 5; 325 MG/1; MG/1
TABLET ORAL
Status: COMPLETED
Start: 2017-04-21 | End: 2017-04-21

## 2017-04-21 RX ORDER — HYDROCODONE BITARTRATE AND ACETAMINOPHEN 5; 325 MG/1; MG/1
1 TABLET ORAL ONCE AS NEEDED
Status: COMPLETED | OUTPATIENT
Start: 2017-04-21 | End: 2017-04-21

## 2017-04-21 RX ORDER — ONDANSETRON 2 MG/ML
4 INJECTION INTRAMUSCULAR; INTRAVENOUS ONCE AS NEEDED
Status: DISCONTINUED | OUTPATIENT
Start: 2017-04-21 | End: 2017-04-21 | Stop reason: HOSPADM

## 2017-04-21 RX ORDER — LIDOCAINE HYDROCHLORIDE 10 MG/ML
INJECTION, SOLUTION INFILTRATION; PERINEURAL AS NEEDED
Status: DISCONTINUED | OUTPATIENT
Start: 2017-04-21 | End: 2017-04-21 | Stop reason: HOSPADM

## 2017-04-21 RX ORDER — SODIUM CHLORIDE, SODIUM LACTATE, POTASSIUM CHLORIDE, CALCIUM CHLORIDE 600; 310; 30; 20 MG/100ML; MG/100ML; MG/100ML; MG/100ML
9 INJECTION, SOLUTION INTRAVENOUS CONTINUOUS
Status: DISCONTINUED | OUTPATIENT
Start: 2017-04-21 | End: 2017-04-21 | Stop reason: HOSPADM

## 2017-04-21 RX ORDER — SODIUM CHLORIDE 0.9 % (FLUSH) 0.9 %
1-10 SYRINGE (ML) INJECTION AS NEEDED
Status: DISCONTINUED | OUTPATIENT
Start: 2017-04-21 | End: 2017-04-21 | Stop reason: HOSPADM

## 2017-04-21 RX ORDER — FENTANYL CITRATE 50 UG/ML
INJECTION, SOLUTION INTRAMUSCULAR; INTRAVENOUS AS NEEDED
Status: DISCONTINUED | OUTPATIENT
Start: 2017-04-21 | End: 2017-04-21 | Stop reason: SURG

## 2017-04-21 RX ORDER — MIDAZOLAM HYDROCHLORIDE 1 MG/ML
2 INJECTION INTRAMUSCULAR; INTRAVENOUS
Status: DISCONTINUED | OUTPATIENT
Start: 2017-04-21 | End: 2017-04-21 | Stop reason: HOSPADM

## 2017-04-21 RX ADMIN — LIDOCAINE HYDROCHLORIDE 100 MG: 20 INJECTION, SOLUTION INFILTRATION; PERINEURAL at 07:31

## 2017-04-21 RX ADMIN — FENTANYL CITRATE 50 MCG: 50 INJECTION, SOLUTION INTRAMUSCULAR; INTRAVENOUS at 07:40

## 2017-04-21 RX ADMIN — LIDOCAINE HYDROCHLORIDE 100 MG: 20 INJECTION, SOLUTION INFILTRATION; PERINEURAL at 07:53

## 2017-04-21 RX ADMIN — ONDANSETRON 4 MG: 2 INJECTION, SOLUTION INTRAMUSCULAR; INTRAVENOUS at 07:23

## 2017-04-21 RX ADMIN — SODIUM CHLORIDE, POTASSIUM CHLORIDE, SODIUM LACTATE AND CALCIUM CHLORIDE 9 ML/HR: 600; 310; 30; 20 INJECTION, SOLUTION INTRAVENOUS at 06:33

## 2017-04-21 RX ADMIN — KETOROLAC TROMETHAMINE 30 MG: 30 INJECTION, SOLUTION INTRAMUSCULAR; INTRAVENOUS at 07:42

## 2017-04-21 RX ADMIN — HYDROCODONE BITARTRATE AND ACETAMINOPHEN 1 TABLET: 5; 325 TABLET ORAL at 08:35

## 2017-04-21 RX ADMIN — MIDAZOLAM HYDROCHLORIDE 2 MG: 1 INJECTION, SOLUTION INTRAMUSCULAR; INTRAVENOUS at 07:23

## 2017-04-21 RX ADMIN — LIDOCAINE HYDROCHLORIDE 0.3 ML: 10 INJECTION, SOLUTION EPIDURAL; INFILTRATION; INTRACAUDAL; PERINEURAL at 06:33

## 2017-04-21 RX ADMIN — SODIUM CHLORIDE, POTASSIUM CHLORIDE, SODIUM LACTATE AND CALCIUM CHLORIDE: 600; 310; 30; 20 INJECTION, SOLUTION INTRAVENOUS at 07:30

## 2017-04-21 RX ADMIN — FAMOTIDINE 20 MG: 10 INJECTION INTRAVENOUS at 07:24

## 2017-04-21 RX ADMIN — PROPOFOL 200 MG: 10 INJECTION, EMULSION INTRAVENOUS at 07:32

## 2017-04-21 RX ADMIN — DEXAMETHASONE SODIUM PHOSPHATE 4 MG: 4 INJECTION, SOLUTION INTRAMUSCULAR; INTRAVENOUS at 07:23

## 2017-04-21 NOTE — ANESTHESIA PROCEDURE NOTES
Airway  Urgency: elective    Date/Time: 4/21/2017 7:34 AM  End Time:4/21/2017 7:35 AM    General Information and Staff    Patient location during procedure: OR  CRNA: SARAHI KOLB    Indications and Patient Condition    Preoxygenated: yes  MILS maintained throughout  Mask difficulty assessment: 0 - not attempted    Final Airway Details  Final airway type: supraglottic airway      Successful airway: unique  Size 4    Number of attempts at approach: 1

## 2017-04-21 NOTE — PLAN OF CARE
Problem: Perioperative Period (Adult)  Goal: Signs and Symptoms of Listed Potential Problems Will be Absent or Manageable (Perioperative Period)  Outcome: Ongoing (interventions implemented as appropriate)    04/21/17 0684   Perioperative Period   Problems Assessed (Perioperative Period) all   Problems Present (Perioperative Period) pain

## 2017-04-21 NOTE — PLAN OF CARE
Problem: Patient Care Overview (Adult)  Goal: Plan of Care Review  Outcome: Outcome(s) achieved Date Met:  04/21/17 04/21/17 0836   Coping/Psychosocial Response Interventions   Plan Of Care Reviewed With patient   Patient Care Overview   Progress improving   Outcome Evaluation   Outcome Summary/Follow up Plan vss, waiting to go home

## 2017-04-21 NOTE — PLAN OF CARE
Problem: Patient Care Overview (Adult)  Goal: Adult Individualization and Mutuality  Outcome: Ongoing (interventions implemented as appropriate)    04/21/17 0619   Individualization   Patient Specific Preferences none

## 2017-04-21 NOTE — PLAN OF CARE
Problem: Perioperative Period (Adult)  Goal: Signs and Symptoms of Listed Potential Problems Will be Absent or Manageable (Perioperative Period)  Outcome: Outcome(s) achieved Date Met:  04/21/17 04/21/17 0836   Perioperative Period   Problems Assessed (Perioperative Period) all   Problems Present (Perioperative Period) none

## 2017-04-21 NOTE — ANESTHESIA POSTPROCEDURE EVALUATION
Patient: Ciro Escoto    Procedure Summary     Date Anesthesia Start Anesthesia Stop Room / Location    04/21/17 0730 0805 [facility] OR [facility] / [facility]       Procedure Diagnosis Surgeon Provider    KNEE ARTHROSCOPY medial meiscal debriedment (Right Knee) Acute meniscal tear of knee, right, initial encounter  (Acute meniscal tear of knee, right, initial encounter [S83.206A]) MD Nathalia Garza CRNA          Anesthesia Type: general  Last vitals  /71 (04/21/17 0817)    Temp 97.9 °F (36.6 °C) (04/21/17 0817)    Pulse 61 (04/21/17 0817)   Resp 18 (04/21/17 0817)    SpO2 100 % (04/21/17 0817)      Post Anesthesia Care and Evaluation    Patient location during evaluation: PHASE II  Patient participation: complete - patient participated  Level of consciousness: awake and alert  Pain score: 0  Pain management: adequate  Airway patency: patent  Anesthetic complications: No anesthetic complications  PONV Status: noneRespiratory status: acceptable  Hydration status: acceptable

## 2017-04-21 NOTE — PLAN OF CARE
Problem: Patient Care Overview (Adult)  Goal: Plan of Care Review  Outcome: Ongoing (interventions implemented as appropriate)    04/21/17 0626   Coping/Psychosocial Response Interventions   Plan Of Care Reviewed With patient   Patient Care Overview   Progress no change   Outcome Evaluation   Outcome Summary/Follow up Plan vss, waiting for procedure

## 2017-04-21 NOTE — PLAN OF CARE
Problem: Patient Care Overview (Adult)  Goal: Adult Individualization and Mutuality  Outcome: Outcome(s) achieved Date Met:  04/21/17 04/21/17 0626   Individualization   Patient Specific Preferences none

## 2017-04-21 NOTE — OP NOTE
DATE OF PROCEDURE: 04/21/2017    PREOPERATIVE DIAGNOSIS: Recurrent tear of medial meniscus, right knee.    POSTOPERATIVE DIAGNOSES:   1. Recurrent tear of medial meniscus, right knee.   2. 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  3. Partial tear, less than 10%, of anterior cruciate ligament.     PROCEDURES PERFORMED: Diagnostic and operative arthroscopy with debridement of the recurrent medial meniscal tear and debridement of chondral debris.     SURGEON: Manjeet Cabral MD     ANESTHESIA: General.     DESCRIPTION OF PROCEDURE: Patient was brought to the operating room and after satisfactory anesthesia was obtained, a pneumatic tourniquet was placed around the right upper leg. Timeout was completed, identifying the patient and the procedure correctly. The right leg was then exsanguinated. Tourniquet was elevated to 250 mmHg. It was then prepped, and after the prep dried for 3 minutes, it was draped in a sterile field in the usual manner with timeout again completed, identifying the patient and the procedure correctly. Medial and lateral portals were developed. Through the lateral portal, the diagnostic was carried out, at which time the above-stated findings were noted. Using a 4.0 shaver and the ArthroCare probe, debridement of the meniscal tear from the body to the posterior horn was carried out back to a stable peripheral rim. Debridement of chondral loose body was also carried out. Inspection of the lateral compartment was completely benign, the patellofemoral compartment was benign, and the medial compartment again showed the grade 1 changes on the medial femoral condyle, but it was minimal. The anterior cruciate did have some laxity, but it was probably less than 10% involved, and it was debrided with the ArthroCare probe. The joint was then lavaged and drained, then injected with 20 mL of 1% lidocaine with 2 mg of morphine. Portal sites were closed with subcutaneous 3-0 Vicryl and Steri-Strips.  Bulky compressive dressing was applied. Tourniquet was released. Patient awakened, taken to recovery, having tolerated the procedure well.     Manjeet Cabral M.D.  Antonia  D:  04/21/2017 08:03:42   T:  04/21/2017 08:21:48   Job ID:  [id number]   Document ID:  [id number]  cc:

## 2017-04-21 NOTE — ANESTHESIA PREPROCEDURE EVALUATION
Anesthesia Evaluation     Patient summary reviewed and Nursing notes reviewed   no history of anesthetic complications:  NPO Status: > 8 hours   Airway   Mallampati: II  TM distance: >3 FB  Neck ROM: full  no difficulty expected  Dental          Pulmonary - negative pulmonary ROS    breath sounds clear to auscultation  Cardiovascular   Exercise tolerance: good (4-7 METS)    Rhythm: regular  Rate: normal        Neuro/Psych- negative ROS  GI/Hepatic/Renal/Endo - negative ROS     Musculoskeletal     (+) joint swelling (right knee),   Abdominal    Substance History   (+) alcohol use (weekly), drug use (MJ 2x per day)     OB/GYN          Other                                    Anesthesia Plan    ASA 2     general     intravenous induction   Anesthetic plan and risks discussed with patient.  Use of blood products discussed with patient  Consented to blood products.

## 2017-04-21 NOTE — PLAN OF CARE
Problem: Perioperative Period (Adult)  Goal: Signs and Symptoms of Listed Potential Problems Will be Absent or Manageable (Perioperative Period)  Outcome: Ongoing (interventions implemented as appropriate)    04/21/17 0627   Perioperative Period   Problems Assessed (Perioperative Period) all   Problems Present (Perioperative Period) pain

## 2017-04-27 ENCOUNTER — OFFICE VISIT (OUTPATIENT)
Dept: ORTHOPEDIC SURGERY | Facility: CLINIC | Age: 27
End: 2017-04-27

## 2017-04-27 DIAGNOSIS — S83.206A ACUTE MENISCAL TEAR OF KNEE, RIGHT, INITIAL ENCOUNTER: Primary | ICD-10-CM

## 2017-04-27 DIAGNOSIS — Z98.890 S/P RIGHT KNEE ARTHROSCOPY: ICD-10-CM

## 2017-04-27 PROCEDURE — 99024 POSTOP FOLLOW-UP VISIT: CPT | Performed by: ORTHOPAEDIC SURGERY

## 2017-04-27 RX ORDER — HYDROCODONE BITARTRATE AND ACETAMINOPHEN 5; 325 MG/1; MG/1
1 TABLET ORAL EVERY 4 HOURS PRN
Qty: 30 TABLET | Refills: 0 | Status: SHIPPED | OUTPATIENT
Start: 2017-04-27 | End: 2017-05-04 | Stop reason: SDUPTHER

## 2017-04-27 NOTE — PROGRESS NOTES
"Subjective: Status post right knee arthroscopy     Patient ID: Ciro Escoto is a 26 y.o. male.    Chief Complaint:    History of Present Illness patient is 6 days status post above stated procedure and is doing well.  Ambulating with only with crutches with just minimal pain.       Social History     Occupational History   • Not on file.     Social History Main Topics   • Smoking status: Never Smoker   • Smokeless tobacco: Never Used   • Alcohol use 6.0 oz/week     8 Cans of beer, 2 Shots of liquor per week      Comment: \"social\"   • Drug use: No   • Sexual activity: Defer      Review of Systems   Constitutional: Negative for chills, diaphoresis, fever and unexpected weight change.   HENT: Negative for hearing loss, nosebleeds, sore throat and tinnitus.    Eyes: Negative for pain and visual disturbance.   Respiratory: Negative for cough, shortness of breath and wheezing.    Cardiovascular: Negative for chest pain and palpitations.   Gastrointestinal: Negative for abdominal pain, diarrhea, nausea and vomiting.   Endocrine: Negative for cold intolerance, heat intolerance and polydipsia.   Genitourinary: Negative for difficulty urinating, dysuria and hematuria.   Musculoskeletal: Negative for arthralgias, joint swelling and myalgias.   Skin: Negative for rash and wound.   Allergic/Immunologic: Negative for environmental allergies.   Neurological: Negative for dizziness, syncope and numbness.   Hematological: Does not bruise/bleed easily.   Psychiatric/Behavioral: Negative for dysphoric mood and sleep disturbance. The patient is not nervous/anxious.          Past Medical History:   Diagnosis Date   • Tear of meniscus of knee      Past Surgical History:   Procedure Laterality Date   • APPENDECTOMY     • KNEE ARTHROSCOPY Right 9/30/2016    Procedure: KNEE ARTHROSCOPY;  Surgeon: Manjeet Cabral MD;  Location: [facility];  Service:    • KNEE ARTHROSCOPY Right 4/21/2017    Pro[facility];  Surgeon: Manjeet Cabral MD;  Location: [facility];  Service:    • KNEE SURGERY Right      No family history on file.      Objective:  There were no vitals filed for this visit.  There were no vitals filed for this visit.  There is no height or weight on file to calculate BMI.       Ortho Exam  dressing was changed.  His wound is completely benign.  Full extension and flexion about 75-80°.    Assessment:       1. Acute meniscal tear of knee, right, initial encounter    2. S/P right knee arthroscopy          Plan:      weightbearing as tolerated.  Instructed on range of motion exercises.  Refilled his pain medication.  Continue the nonsteroidal anti-inflammatory.  Return to see me in 3 weeks off work until seen      Work Status:    CJ query complete.    Orders:  No orders of the defined types were placed in this encounter.      Medications:  New Medications Ordered This Visit   Medications   • HYDROcodone-acetaminophen (NORCO) 5-325 MG per tablet     Sig: Take 1 tablet by mouth Every 4 (Four) Hours As Needed for Moderate Pain (4-6) or Severe Pain (7-10).     Dispense:  30 tablet     Refill:  0       Followup:  Return in about 3 weeks (around 5/18/2017).          Dragon transcription disclaimer     Much of this encounter note is an electronic transcription/translation of spoken language to printed text. The electronic translation of spoken language may permit erroneous, or at times, nonsensical words or phrases to be inadvertently transcribed. Although I have reviewed the note for such errors, some may still exist.

## 2017-05-04 RX ORDER — HYDROCODONE BITARTRATE AND ACETAMINOPHEN 5; 325 MG/1; MG/1
1 TABLET ORAL EVERY 4 HOURS PRN
Qty: 30 TABLET | Refills: 0 | Status: SHIPPED | OUTPATIENT
Start: 2017-05-04 | End: 2017-05-10 | Stop reason: SDUPTHER

## 2017-05-10 RX ORDER — HYDROCODONE BITARTRATE AND ACETAMINOPHEN 5; 325 MG/1; MG/1
1 TABLET ORAL EVERY 4 HOURS PRN
Qty: 30 TABLET | Refills: 0 | Status: SHIPPED | OUTPATIENT
Start: 2017-05-10

## 2017-05-18 ENCOUNTER — OFFICE VISIT (OUTPATIENT)
Dept: ORTHOPEDIC SURGERY | Facility: CLINIC | Age: 27
End: 2017-05-18

## 2017-05-18 DIAGNOSIS — S83.206A ACUTE MENISCAL TEAR OF KNEE, RIGHT, INITIAL ENCOUNTER: Primary | ICD-10-CM

## 2017-05-18 DIAGNOSIS — Z98.890 S/P RIGHT KNEE ARTHROSCOPY: ICD-10-CM

## 2017-05-18 PROCEDURE — 99024 POSTOP FOLLOW-UP VISIT: CPT | Performed by: ORTHOPAEDIC SURGERY

## 2017-05-24 ENCOUNTER — DOCUMENTATION (OUTPATIENT)
Dept: PHYSICAL THERAPY | Facility: HOSPITAL | Age: 27
End: 2017-05-24

## 2017-05-24 DIAGNOSIS — S83.241A ACUTE MEDIAL MENISCAL TEAR, RIGHT, INITIAL ENCOUNTER: Primary | ICD-10-CM

## (undated) DEVICE — ACTIVE WRAP®, KNEE/LEG: Brand: DEROYAL

## (undated) DEVICE — DRSNG WND GZ CURAD OIL EMULSION 3X3IN STRL

## (undated) DEVICE — ST TB GOFLO STRL

## (undated) DEVICE — LAG ARTHROSCOPY: Brand: MEDLINE INDUSTRIES, INC.

## (undated) DEVICE — Device

## (undated) DEVICE — APPL CHLORAPREP W/TINT 26ML ORNG

## (undated) DEVICE — 4.5 MM INCISOR PLUS ELITE STRAIGHT                                    DISPOSABLE BLADES, SLATE,PACKAGED 6                                    PER BOX, STERILE

## (undated) DEVICE — 3M™ STERI-STRIP™ REINFORCED ADHESIVE SKIN CLOSURES, R1547, 1/2 IN X 4 IN (12 MM X 100 MM), 6 STRIPS/ENVELOPE: Brand: 3M™ STERI-STRIP™

## (undated) DEVICE — SPNG GZ WOVN 4X4IN 12PLY 10/BX STRL

## (undated) DEVICE — GLV SURG EUDERMIC PF LTX 8 STRL

## (undated) DEVICE — UNDYED BRAIDED (POLYGLACTIN 910), SYNTHETIC ABSORBABLE SUTURE: Brand: COATED VICRYL

## (undated) DEVICE — 3M™ STERI-STRIP™ COMPOUND BENZOIN TINCTURE 40 BAGS/CARTON 4 CARTONS/CASE C1544: Brand: 3M™ STERI-STRIP™

## (undated) DEVICE — TOWEL,OR,DSP,ST,BLUE,STD,4/PK,20PK/CS: Brand: MEDLINE

## (undated) DEVICE — DRP Z/FRICTION 10X16IN

## (undated) DEVICE — TRANSPOSAL ULTRAFLEX DUO/QUAD ULTRA CART MANIFOLD

## (undated) DEVICE — SUPER MULTIVAC 50 WITH INTEGRATED                                    FINGER SWITCHES IFS: Brand: COBLATION

## (undated) DEVICE — CLEAR-TRAC DISPOSABLE STOPCOCK: Brand: CLEAR-TRAC

## (undated) DEVICE — DISPOSABLE TOURNIQUET CUFF SINGLE BLADDER, SINGLE PORT AND LUER LOCK CONNECTOR: Brand: COLOR CUFF